# Patient Record
Sex: MALE | Race: BLACK OR AFRICAN AMERICAN | Employment: FULL TIME | URBAN - METROPOLITAN AREA
[De-identification: names, ages, dates, MRNs, and addresses within clinical notes are randomized per-mention and may not be internally consistent; named-entity substitution may affect disease eponyms.]

---

## 2019-05-21 PROBLEM — E55.9 VITAMIN D DEFICIENCY: Status: ACTIVE | Noted: 2018-08-06

## 2019-06-22 PROBLEM — D17.9 MULTIPLE LIPOMAS: Status: ACTIVE | Noted: 2019-06-22

## 2021-01-07 PROBLEM — Z79.899 ENCOUNTER FOR LONG-TERM (CURRENT) USE OF MEDICATIONS: Status: ACTIVE | Noted: 2021-01-07

## 2021-01-07 PROBLEM — B00.9 HSV INFECTION: Status: ACTIVE | Noted: 2020-10-12

## 2021-01-07 PROBLEM — K21.9 GASTROESOPHAGEAL REFLUX DISEASE WITHOUT ESOPHAGITIS: Status: ACTIVE | Noted: 2021-01-07

## 2021-03-04 ENCOUNTER — HOSPITAL ENCOUNTER (EMERGENCY)
Age: 21
Discharge: HOME | End: 2021-03-04
Attending: EMERGENCY MEDICINE
Payer: MEDICAID

## 2021-03-04 VITALS
RESPIRATION RATE: 14 BRPM | OXYGEN SATURATION: 99 % | SYSTOLIC BLOOD PRESSURE: 100 MMHG | WEIGHT: 160 LBS | HEIGHT: 68 IN | HEART RATE: 78 BPM | TEMPERATURE: 97.9 F | BODY MASS INDEX: 24.25 KG/M2 | DIASTOLIC BLOOD PRESSURE: 67 MMHG

## 2021-03-04 DIAGNOSIS — M79.12 STERNOCLEIDOMASTOID MUSCLE TENDERNESS: ICD-10-CM

## 2021-03-04 DIAGNOSIS — M62.838 NECK MUSCLE SPASM: Primary | ICD-10-CM

## 2021-03-04 PROCEDURE — 99283 EMERGENCY DEPT VISIT LOW MDM: CPT

## 2021-03-04 PROCEDURE — 74011250636 HC RX REV CODE- 250/636: Performed by: PHYSICIAN ASSISTANT

## 2021-03-04 PROCEDURE — 96372 THER/PROPH/DIAG INJ SC/IM: CPT

## 2021-03-04 RX ORDER — KETOROLAC TROMETHAMINE 30 MG/ML
60 INJECTION, SOLUTION INTRAMUSCULAR; INTRAVENOUS
Status: COMPLETED | OUTPATIENT
Start: 2021-03-04 | End: 2021-03-04

## 2021-03-04 RX ORDER — CYCLOBENZAPRINE HCL 10 MG
10 TABLET ORAL
Qty: 21 TAB | Refills: 0 | Status: SHIPPED | OUTPATIENT
Start: 2021-03-04 | End: 2021-03-11

## 2021-03-04 RX ADMIN — KETOROLAC TROMETHAMINE 60 MG: 30 INJECTION, SOLUTION INTRAMUSCULAR at 08:26

## 2021-03-04 NOTE — ED NOTES
Patient advises cracking his neck last night around midnight and since that time he says he is having difficulty turning his head completely or moving head up and down. Mask on during triage.

## 2021-03-04 NOTE — LETTER
[address] [address] EMERGENCY DEPT 
[address] 
[place] [address] 
[phone] Work/School Note Date: 3/4/2021 To Whom It May concern: 
 
 
Shandra Montgomery was seen and treated today in the emergency room by the following provider(s): 
Attending Provider: Kayleigh Huitron MD 
Physician Assistant: Regino Zhu. Shandra Montgomery is excused from work/school on 03/04/21. He is clear to return to work/school on 03/05/21. Sincerely, Dee LAN

## 2021-03-04 NOTE — ED NOTES
I have reviewed discharge instructions with the patient. The patient verbalized understanding. Patient left ED via Discharge Method: ambulatory to Home with family. Opportunity for questions and clarification provided. Patient given 1 scripts. To continue your aftercare when you leave the hospital, you may receive an automated call from our care team to check in on how you are doing. This is a free service and part of our promise to provide the best care and service to meet your aftercare needs.  If you have questions, or wish to unsubscribe from this service please call [phone]. Thank you for Choosing our [facility] Emergency Department.

## 2021-03-04 NOTE — DISCHARGE INSTRUCTIONS
Take muscle relaxant every 8 hours as needed for muscle spasm. Do not drive or operate machinery while taking this medication. You may take ibuprofen every 6-8 hours for pain. Do not take the medication until 8 hours after the injection today. You may also take Tylenol every 4-6 hours for pain and apply ice packs and heating pads to the neck for symptomatic relief.

## 2021-04-19 PROBLEM — Z86.19 HISTORY OF HERPES GENITALIS: Status: ACTIVE | Noted: 2021-04-19

## 2022-03-18 PROBLEM — B00.9 HSV INFECTION: Status: ACTIVE | Noted: 2020-10-12

## 2022-03-18 PROBLEM — E55.9 VITAMIN D DEFICIENCY: Status: ACTIVE | Noted: 2018-08-06

## 2022-03-19 PROBLEM — Z86.19 HISTORY OF HERPES GENITALIS: Status: ACTIVE | Noted: 2021-04-19

## 2022-03-19 PROBLEM — M79.12 STERNOCLEIDOMASTOID MUSCLE TENDERNESS: Status: ACTIVE | Noted: 2021-03-04

## 2022-03-19 PROBLEM — D17.9 MULTIPLE LIPOMAS: Status: ACTIVE | Noted: 2019-06-22

## 2022-03-19 PROBLEM — M62.838 NECK MUSCLE SPASM: Status: ACTIVE | Noted: 2021-03-04

## 2022-03-19 PROBLEM — K21.9 GASTROESOPHAGEAL REFLUX DISEASE WITHOUT ESOPHAGITIS: Status: ACTIVE | Noted: 2021-01-07

## 2022-03-20 PROBLEM — Z79.899 ENCOUNTER FOR LONG-TERM (CURRENT) USE OF MEDICATIONS: Status: ACTIVE | Noted: 2021-01-07

## 2022-07-25 ENCOUNTER — TELEMEDICINE (OUTPATIENT)
Dept: PRIMARY CARE CLINIC | Facility: CLINIC | Age: 22
End: 2022-07-25
Payer: COMMERCIAL

## 2022-07-25 DIAGNOSIS — Z79.899 ENCOUNTER FOR LONG-TERM (CURRENT) USE OF MEDICATIONS: ICD-10-CM

## 2022-07-25 DIAGNOSIS — K21.9 GASTROESOPHAGEAL REFLUX DISEASE WITHOUT ESOPHAGITIS: ICD-10-CM

## 2022-07-25 DIAGNOSIS — E55.9 VITAMIN D DEFICIENCY: Primary | ICD-10-CM

## 2022-07-25 DIAGNOSIS — Z13.220 SCREENING FOR LIPID DISORDERS: ICD-10-CM

## 2022-07-25 DIAGNOSIS — Z83.3 FAMILY HISTORY OF DIABETES MELLITUS IN FATHER: ICD-10-CM

## 2022-07-25 PROCEDURE — 99212 OFFICE O/P EST SF 10 MIN: CPT | Performed by: FAMILY MEDICINE

## 2022-07-25 NOTE — PROGRESS NOTES
Emy Serrato M.D.  [address] [facility]  Phone:  [phone]  Fax:  [phone]          I was in the office while conducting this encounter. The patient was at home. CHIEF COMPLAINT:  Chief Complaint   Patient presents with    Gastroesophageal Reflux     He continues to take Omeprazole for reflux. The medication is helping. Other     He continues to take weekly vitamin D as prescribed. He is doing well with the medication. HISTORY OF PRESENT ILLNESS:  Mr. Artemio Carrillo is a 19 y.o. male  who presents for follow-up. He continues to take omeprazole daily as prescribed for reflux. The medication is helping. He denies any blood in the stools or black stools. He continues to take weekly vitamin D as prescribed. He is doing well with the medication. No other complaints. Taking medications as prescribed. Medications reviewed and updated. HISTORY:  Allergies   Allergen Reactions    Shellfish Allergy Nausea And Vomiting         REVIEW OF SYSTEMS:  Review of systems is as indicated in HPI otherwise negative. PHYSICAL EXAM:    Vital Signs: (As obtained by patient/caregiver at home)  No flowsheet data found.         Constitutional: [x] Appears well-developed and well-nourished [x] No apparent distress      [] Abnormal -     Mental status: [x] Alert and awake  [x] Oriented to person/place/time [x] Able to follow commands    [] Abnormal -     Eyes:   EOM    [x]  Normal    [] Abnormal -   Sclera  [x]  Normal    [] Abnormal -          Discharge [x]  None visible   [] Abnormal -     HENT: [x] Normocephalic, atraumatic  [] Abnormal -   [x] Mouth/Throat: Mucous membranes are moist    External Ears [x] Normal  [] Abnormal -    Neck: [x] No visualized mass [] Abnormal -     Pulmonary/Chest: [x] Respiratory effort normal   [x] No visualized signs of difficulty breathing or respiratory distress             Musculoskeletal:   [x] Normal gait with no signs of ataxia         [x] Normal range of motion of neck        [] Abnormal -     Neurological:        [x] No Facial Asymmetry (Cranial nerve 7 motor function) (limited exam due to video visit)          [x] No gaze palsy        [] Abnormal -          Skin:        [x] No significant exanthematous lesions or discoloration noted on facial skin         [] Abnormal -            Psychiatric:       [x] Normal Affect [] Abnormal -        [x] No Hallucinations    PHQ:  PHQ-9  7/8/2021   Little interest or pleasure in doing things 0   Total Score PHQ 2 0       LABS  No results found for this visit on 07/25/22.   Office Visit on 02/06/2020   Component Date Value Ref Range Status    Glucose 02/06/2020 73  65 - 99 mg/dL Final    BUN 02/06/2020 12  6 - 20 mg/dL Final    Creatinine 02/06/2020 1.23  0.76 - 1.27 mg/dL Final    EGFR IF NonAfrican American 02/06/2020 83  >59 mL/min/1.73 Final    GFR  02/06/2020 96  >59 mL/min/1.73 Final    Bun/Cre Ratio 02/06/2020 10  9 - 20 NA Final    Sodium 02/06/2020 143  134 - 144 mmol/L Final    Potassium 02/06/2020 4.6  3.5 - 5.2 mmol/L Final    Chloride 02/06/2020 104  96 - 106 mmol/L Final    CO2 02/06/2020 24  20 - 29 mmol/L Final    Calcium 02/06/2020 9.5  8.7 - 10.2 mg/dL Final    Total Protein 02/06/2020 7.1  6.0 - 8.5 g/dL Final    Albumin 02/06/2020 4.3  4.1 - 5.2 g/dL Final                  **Please note reference interval change**    Globulin, Total 02/06/2020 2.8  1.5 - 4.5 g/dL Final    Albumin/Globulin Ratio 02/06/2020 1.5  1.2 - 2.2 NA Final    Total Bilirubin 02/06/2020 0.4  0.0 - 1.2 mg/dL Final    Alkaline Phosphatase 02/06/2020 70  39 - 117 IU/L Final    AST 02/06/2020 17  0 - 40 IU/L Final    ALT 02/06/2020 9  0 - 44 IU/L Final    WBC 02/06/2020 5.3  3.4 - 10.8 x10E3/uL Final    RBC 02/06/2020 5.24  4.14 - 5.80 x10E6/uL Final    Hemoglobin 02/06/2020 15.0  13.0 - 17.7 g/dL Final    Hematocrit 02/06/2020 44.2  37.5 - 51.0 % Final    MCV 02/06/2020 84  79 - 97 fL Final    MCH 02/06/2020 28.6  26.6 - 33.0 pg Final    MCHC 02/06/2020 33.9  31.5 - 35.7 g/dL Final    RDW 02/06/2020 13.2  11.6 - 15.4 % Final    Platelets 14/56[birth year]1988 243  150 - 450 x10E3/uL Final    Neutrophils % 02/06/2020 38  Not Estab. % Final    Lymphocytes % 02/06/2020 50  Not Estab. % Final    Monocytes % 02/06/2020 10  Not Estab. % Final    Eosinophils % 02/06/2020 2  Not Estab. % Final    Basophils % 02/06/2020 0  Not Estab. % Final    Neutrophils Absolute 02/06/2020 2.0  1.4 - 7.0 x10E3/uL Final    Lymphocytes Absolute 02/06/2020 2.6  0.7 - 3.1 x10E3/uL Final    Monocytes Absolute 02/06/2020 0.5  0.1 - 0.9 x10E3/uL Final    Eosinophils Absolute 02/06/2020 0.1  0.0 - 0.4 x10E3/uL Final    Basophils Absolute 02/06/2020 0.0  0.0 - 0.2 x10E3/uL Final    Immature Granulocytes 02/06/2020 0  Not Estab. % Final    GRANULOCYTE ABSOLUTE COUNT 02/06/2020 0.0  0.0 - 0.1 x10E3/uL Final    Cholesterol, Total 02/06/2020 106  100 - 199 mg/dL Final    Triglycerides 02/06/2020 60  0 - 149 mg/dL Final    HDL 02/06/2020 30 (A) >39 mg/dL Final    VLDL Cholesterol Calculated 02/06/2020 12  5 - 40 mg/dL Final    LDL Calculated 02/06/2020 64  0 - 99 mg/dL Final    LDl/HDL Ratio 02/06/2020 2.1  0.0 - 3.6 ratio Final    Comment:                                     LDL/HDL Ratio                                              Men  Women                                1/2 Avg. Risk  1.0    1.5                                    Avg.Risk  3.6    3.2                                 2X Avg. Risk  6.2    5.0                                 3X Avg. Risk  8.0    6.1      Vit D, 25-Hydroxy 02/06/2020 18.9 (A) 30.0 - 100.0 ng/mL Final    Comment: Vitamin D deficiency has been defined by the [address] practice guideline as a  level of serum 25-OH vitamin D less than 20 ng/mL (1,2). The Endocrine Society went on to further define vitamin D  insufficiency as a level between 21 and 29 ng/mL (2).   1. IOM [facility]). 2010. Dietary reference     intakes for calcium and D. [facility]: The     [employer] [facility]. 2. Av MF, Kateryna SANDOVAL, Lebron HESTER, et al.     Evaluation, treatment, and prevention of vitamin D     deficiency: an Endocrine Society clinical practice     guideline. JCEM. 2011 Jul; 96(7):1911-30. IMPRESSION/PLAN     Diagnosis Orders   1. Vitamin D deficiency  Vitamin D 25 Hydroxy      2. Gastroesophageal reflux disease without esophagitis        3. Encounter for long-term (current) use of medications  CBC with Auto Differential    Comprehensive Metabolic Panel      4. Screening for lipid disorders  Lipid Panel      5. Family history of diabetes mellitus in father  Hemoglobin A1C          Follow up and Dispositions:  Return in about 6 months (around 1/25/2023). Patient is stable on medications and will continue current medications. Reviewed medications and side effects in detail. Will check the above labs. Reviewed most recent labs. Reviewed diet, exercise and weight control. Consent: This patient and/or their healthcare decision maker is aware that this patient-initiated Telehealth encounter is a billable service, with coverage as determined by their insurance carrier. Patient is aware that they may receive a bill and has provided verbal consent to proceed: Yes     The patient is being evaluated by a Virtual Visit (video visit) encounter to address concerns as mentioned above. A caregiver was present when appropriate. Due to this being a TeleHealth encounter ([facility]), evaluation of the following organ systems was limited: Vitals/Constitutional/EENT/Resp/CV/GI//MS/Neuro/Skin/Heme-Lymph-Imm.   Pursuant to the emergency declaration under the [facility] waiver authority and the [employer] Act, this Virtual Visit was conducted with patient's (and/or legal guardian's) consent, to reduce the patient's risk of exposure to COVID-19 and provide necessary medical care. The patient (and/or legal guardian) has also been advised to contact this office for worsening conditions or problems, and seek emergency medical treatment and/or call 911 if deemed necessary. Patient identification was verified at the start of the visit: YES    Services were provided through a video synchronous discussion virtually to substitute for in-person clinic visit. Patient and provider were located at their individual homes. Annmarie Lundberg, was evaluated through a synchronous (real-time) audio-video encounter. The patient (or guardian if applicable) is aware that this is a billable service, which includes applicable co-pays. This Virtual Visit was conducted with patient's (and/or legal guardian's) consent. The visit was conducted pursuant to the emergency declaration under the [address] [place] [address], [address] [place] [address] authority and the [employer] Act. Patient identification was verified, and a caregiver was present when appropriate. The patient was located at Home: [address] [place] [address]. Provider was located at [facility] ([address] [place] [address]): [address].. Total Time: minutes: 5-10 minutes. Dictated using voice recognition software.  Proofread, but unrecognized voice recognition errors may exist.    Marshal Babinski, MD  07/25/22

## 2023-03-02 RX ORDER — VALACYCLOVIR HYDROCHLORIDE 1 G/1
TABLET, FILM COATED ORAL
Qty: 30 TABLET | Refills: 0 | Status: SHIPPED | OUTPATIENT
Start: 2023-03-02

## 2023-03-07 RX ORDER — VALACYCLOVIR HYDROCHLORIDE 1 G/1
TABLET, FILM COATED ORAL
Qty: 30 TABLET | Refills: 0 | Status: SHIPPED | OUTPATIENT
Start: 2023-03-07

## 2023-03-07 NOTE — TELEPHONE ENCOUNTER
Requesting a refill on Valacyclovir due to being [place] of [place] and leaving it at home. I told him I would have the nurse call back to discuss what to do.

## 2023-03-07 NOTE — TELEPHONE ENCOUNTER
Patient requests rx be sent to [facility] EMERGENCY [facility] AT [place]

## 2023-04-06 ENCOUNTER — TELEMEDICINE (OUTPATIENT)
Dept: PRIMARY CARE CLINIC | Facility: CLINIC | Age: 23
End: 2023-04-06
Payer: MEDICAID

## 2023-04-06 DIAGNOSIS — Z86.19 HISTORY OF HERPES GENITALIS: ICD-10-CM

## 2023-04-06 DIAGNOSIS — Z13.220 SCREENING FOR LIPID DISORDERS: ICD-10-CM

## 2023-04-06 DIAGNOSIS — Z83.3 FAMILY HISTORY OF DIABETES MELLITUS IN FATHER: ICD-10-CM

## 2023-04-06 DIAGNOSIS — E55.9 VITAMIN D DEFICIENCY: Primary | ICD-10-CM

## 2023-04-06 DIAGNOSIS — Z79.899 ENCOUNTER FOR LONG-TERM (CURRENT) USE OF MEDICATIONS: ICD-10-CM

## 2023-04-06 PROCEDURE — 99214 OFFICE O/P EST MOD 30 MIN: CPT | Performed by: FAMILY MEDICINE

## 2023-04-06 RX ORDER — ERGOCALCIFEROL 1.25 MG/1
50000 CAPSULE ORAL
Qty: 4 CAPSULE | Refills: 5 | Status: SHIPPED | OUTPATIENT
Start: 2023-04-06

## 2023-04-06 RX ORDER — VALACYCLOVIR HYDROCHLORIDE 1 G/1
TABLET, FILM COATED ORAL
Qty: 30 TABLET | Refills: 5 | Status: SHIPPED | OUTPATIENT
Start: 2023-04-06

## 2023-04-06 ASSESSMENT — PATIENT HEALTH QUESTIONNAIRE - PHQ9
SUM OF ALL RESPONSES TO PHQ QUESTIONS 1-9: 0
SUM OF ALL RESPONSES TO PHQ9 QUESTIONS 1 & 2: 0
SUM OF ALL RESPONSES TO PHQ QUESTIONS 1-9: 0
1. LITTLE INTEREST OR PLEASURE IN DOING THINGS: 0
SUM OF ALL RESPONSES TO PHQ QUESTIONS 1-9: 0
2. FEELING DOWN, DEPRESSED OR HOPELESS: 0
SUM OF ALL RESPONSES TO PHQ QUESTIONS 1-9: 0

## 2023-04-06 NOTE — PROGRESS NOTES
Zandra Arredondo M.D.  [address] [facility]  [place]  Phone:  [phone]  Fax:  [phone]          I was in the office while conducting this encounter. The patient was at home. CHIEF COMPLAINT:  Chief Complaint   Patient presents with    Follow-up     Patient here for follow-up. He has been doing well and denies any problems. HISTORY OF PRESENT ILLNESS:  Mr. Melissa Haque is a 23 y.o. male  who presents for follow-up. Patient states that he has been doing well. He takes his weekly vitamin D as prescribed. He needs a refill on the Valtrex and the vitamin D. He has not had any problems with reflux and is doing well and does not need refill on the omeprazole. No other complaints. Taking medications as prescribed. Medications reviewed and updated. HISTORY:  Allergies   Allergen Reactions    Shellfish Allergy Nausea And Vomiting         REVIEW OF SYSTEMS:  Review of systems is as indicated in HPI otherwise negative. PHYSICAL EXAM:    Vital Signs: (As obtained by patient/caregiver at home)  No flowsheet data found.         PHQ:  PHQ-9  4/6/2023   Little interest or pleasure in doing things 0   Little interest or pleasure in doing things -   Feeling down, depressed, or hopeless 0   PHQ-2 Score 0   Total Score PHQ 2 -   PHQ-9 Total Score 0       LABS    Office Visit on 02/06/2020   Component Date Value Ref Range Status    Glucose 02/06/2020 73  65 - 99 mg/dL Final    BUN 02/06/2020 12  6 - 20 mg/dL Final    Creatinine 02/06/2020 1.23  0.76 - 1.27 mg/dL Final    EGFR IF NonAfrican American 02/06/2020 83  >59 mL/min/1.73 Final    GFR  02/06/2020 96  >59 mL/min/1.73 Final    Bun/Cre Ratio 02/06/2020 10  9 - 20 NA Final    Sodium 02/06/2020 143  134 - 144 mmol/L Final    Potassium 02/06/2020 4.6  3.5 - 5.2 mmol/L Final    Chloride 02/06/2020 104  96 - 106 mmol/L Final    CO2 02/06/2020 24  20 - 29 mmol/L Final    Calcium 02/06/2020 9.5  8.7 -

## 2023-05-09 ENCOUNTER — APPOINTMENT (OUTPATIENT)
Dept: GENERAL RADIOLOGY | Age: 23
End: 2023-05-09
Payer: MEDICAID

## 2023-05-09 ENCOUNTER — HOSPITAL ENCOUNTER (EMERGENCY)
Age: 23
Discharge: HOME | End: 2023-05-09
Attending: EMERGENCY MEDICINE
Payer: MEDICAID

## 2023-05-09 VITALS
DIASTOLIC BLOOD PRESSURE: 78 MMHG | BODY MASS INDEX: 25.01 KG/M2 | OXYGEN SATURATION: 97 % | HEART RATE: 72 BPM | RESPIRATION RATE: 14 BRPM | WEIGHT: 165 LBS | SYSTOLIC BLOOD PRESSURE: 113 MMHG | HEIGHT: 68 IN | TEMPERATURE: 98.4 F

## 2023-05-09 DIAGNOSIS — M54.6 ACUTE LEFT-SIDED THORACIC BACK PAIN: ICD-10-CM

## 2023-05-09 DIAGNOSIS — M79.10 MYALGIA: ICD-10-CM

## 2023-05-09 DIAGNOSIS — J40 BRONCHITIS: Primary | ICD-10-CM

## 2023-05-09 LAB
ALBUMIN SERPL-MCNC: 4.6 G/DL (ref 3.5–5)
ALBUMIN/GLOB SERPL: 1.2 (ref 0.4–1.6)
ALP SERPL-CCNC: 107 U/L (ref 50–136)
ALT SERPL-CCNC: 21 U/L (ref 12–65)
ANION GAP SERPL CALC-SCNC: 7 MMOL/L (ref 2–11)
AST SERPL-CCNC: 16 U/L (ref 15–37)
BILIRUB SERPL-MCNC: 1 MG/DL (ref 0.2–1.1)
BUN SERPL-MCNC: 18 MG/DL (ref 6–23)
CALCIUM SERPL-MCNC: 9.9 MG/DL (ref 8.3–10.4)
CHLORIDE SERPL-SCNC: 107 MMOL/L (ref 101–110)
CO2 SERPL-SCNC: 26 MMOL/L (ref 21–32)
CREAT SERPL-MCNC: 1.44 MG/DL (ref 0.8–1.5)
EKG ATRIAL RATE: 79 BPM
EKG ATRIAL RATE: 91 BPM
EKG DIAGNOSIS: NORMAL
EKG DIAGNOSIS: NORMAL
EKG P AXIS: 109 DEGREES
EKG P AXIS: 115 DEGREES
EKG P-R INTERVAL: 112 MS
EKG P-R INTERVAL: 124 MS
EKG Q-T INTERVAL: 374 MS
EKG Q-T INTERVAL: 390 MS
EKG QRS DURATION: 74 MS
EKG QRS DURATION: 80 MS
EKG QTC CALCULATION (BAZETT): 428 MS
EKG QTC CALCULATION (BAZETT): 474 MS
EKG R AXIS: 177 DEGREES
EKG R AXIS: 58 DEGREES
EKG T AXIS: 209 DEGREES
EKG T AXIS: 24 DEGREES
EKG VENTRICULAR RATE: 79 BPM
EKG VENTRICULAR RATE: 89 BPM
ERYTHROCYTE [DISTWIDTH] IN BLOOD BY AUTOMATED COUNT: 12.9 % (ref 11.9–14.6)
GLOBULIN SER CALC-MCNC: 4 G/DL (ref 2.8–4.5)
GLUCOSE SERPL-MCNC: 93 MG/DL (ref 65–100)
HCT VFR BLD AUTO: 45.3 % (ref 41.1–50.3)
HGB BLD-MCNC: 15.3 G/DL (ref 13.6–17.2)
LIPASE SERPL-CCNC: 85 U/L (ref 73–393)
MCH RBC QN AUTO: 29.5 PG (ref 26.1–32.9)
MCHC RBC AUTO-ENTMCNC: 33.8 G/DL (ref 31.4–35)
MCV RBC AUTO: 87.5 FL (ref 82–102)
NRBC # BLD: 0 K/UL (ref 0–0.2)
PLATELET # BLD AUTO: 217 K/UL (ref 150–450)
PMV BLD AUTO: 9.5 FL (ref 9.4–12.3)
POTASSIUM SERPL-SCNC: 3.4 MMOL/L (ref 3.5–5.1)
PROT SERPL-MCNC: 8.6 G/DL (ref 6.3–8.2)
RBC # BLD AUTO: 5.18 M/UL (ref 4.23–5.6)
SODIUM SERPL-SCNC: 140 MMOL/L (ref 133–143)
TROPONIN I SERPL HS-MCNC: <3 PG/ML (ref 0–14)
TROPONIN I SERPL HS-MCNC: <3 PG/ML (ref 0–14)
WBC # BLD AUTO: 14.2 K/UL (ref 4.3–11.1)

## 2023-05-09 PROCEDURE — 80053 COMPREHEN METABOLIC PANEL: CPT

## 2023-05-09 PROCEDURE — 85027 COMPLETE CBC AUTOMATED: CPT

## 2023-05-09 PROCEDURE — 94760 N-INVAS EAR/PLS OXIMETRY 1: CPT

## 2023-05-09 PROCEDURE — 84484 ASSAY OF TROPONIN QUANT: CPT

## 2023-05-09 PROCEDURE — 93005 ELECTROCARDIOGRAM TRACING: CPT | Performed by: EMERGENCY MEDICINE

## 2023-05-09 PROCEDURE — 96360 HYDRATION IV INFUSION INIT: CPT

## 2023-05-09 PROCEDURE — 2580000003 HC RX 258: Performed by: EMERGENCY MEDICINE

## 2023-05-09 PROCEDURE — 83690 ASSAY OF LIPASE: CPT

## 2023-05-09 PROCEDURE — 99285 EMERGENCY DEPT VISIT HI MDM: CPT

## 2023-05-09 PROCEDURE — 71046 X-RAY EXAM CHEST 2 VIEWS: CPT

## 2023-05-09 RX ORDER — DOXYCYCLINE HYCLATE 100 MG
100 TABLET ORAL 2 TIMES DAILY
Qty: 14 TABLET | Refills: 0 | Status: SHIPPED | OUTPATIENT
Start: 2023-05-09 | End: 2023-05-16

## 2023-05-09 RX ORDER — 0.9 % SODIUM CHLORIDE 0.9 %
1000 INTRAVENOUS SOLUTION INTRAVENOUS ONCE
Status: COMPLETED | OUTPATIENT
Start: 2023-05-09 | End: 2023-05-09

## 2023-05-09 RX ADMIN — SODIUM CHLORIDE 1000 ML: 900 INJECTION, SOLUTION INTRAVENOUS at 13:19

## 2023-05-09 ASSESSMENT — PAIN SCALES - GENERAL
PAINLEVEL_OUTOF10: 7
PAINLEVEL_OUTOF10: 6

## 2023-05-09 ASSESSMENT — PAIN DESCRIPTION - LOCATION
LOCATION: BACK
LOCATION: HEAD

## 2023-05-09 ASSESSMENT — PAIN DESCRIPTION - PAIN TYPE: TYPE: ACUTE PAIN

## 2023-05-09 ASSESSMENT — PAIN - FUNCTIONAL ASSESSMENT
PAIN_FUNCTIONAL_ASSESSMENT: 0-10
PAIN_FUNCTIONAL_ASSESSMENT: 0-10

## 2023-05-09 ASSESSMENT — PAIN DESCRIPTION - ORIENTATION: ORIENTATION: LEFT;UPPER

## 2023-05-09 ASSESSMENT — PAIN DESCRIPTION - DESCRIPTORS: DESCRIPTORS: PRESSURE

## 2023-05-09 NOTE — ED TRIAGE NOTES
Pt c/o chest pain and tingling in his hands and feet and lips. Chest pain started this morning on the way to work . Diaphoretic. Head was dizzy.

## 2023-05-09 NOTE — ED PROVIDER NOTES
& T wave abnormality, consider inferior ischemia  Abnormal ECG  When compared with ECG of 09-MAY-2023 09:09,  Current undetermined rhythm precludes rhythm comparison, needs review  Confirmed by [facility]  MD ()Sampson ([id number]) on 5/9/2023 10:19:30 AM     EKG 12 Lead   Result Value Ref Range    Ventricular Rate 79 BPM    Atrial Rate 79 BPM    P-R Interval 112 ms    QRS Duration 74 ms    Q-T Interval 374 ms    QTc Calculation (Bazett) 428 ms    P Axis 115 degrees    R Axis 58 degrees    T Axis 24 degrees    Diagnosis       Normal sinus rhythm  Normal ECG  When compared with ECG of 09-MAY-2023 09:09,  Previous ECG has undetermined rhythm, needs review  QRS axis Shifted left  Confirmed by ILEANA MARTÍNEZ ()Sampson ([id number]) on 5/9/2023 10:19:33 AM          XR CHEST (2 VW)   Final Result   No radiographic evidence of acute cardiopulmonary disease. Voice dictation software was used during the making of this note. This software is not perfect and grammatical and other typographical errors may be present. This note has not been completely proofread for errors.        Rob Odom MD  05/09/23 [id number]

## 2023-05-09 NOTE — ED NOTES
I have reviewed discharge instructions with the patient. The patient verbalized understanding. Patient left ED via Discharge Method: ambulatory to Home with (family/friend). Opportunity for questions and clarification provided. Patient given 2 scripts. No esign         To continue your aftercare when you leave the hospital, you may receive an automated call from our care team to check in on how you are doing. This is a free service and part of our promise to provide the best care and service to meet your aftercare needs.  If you have questions, or wish to unsubscribe from this service please call [phone]. Thank you for Choosing our [facility] Emergency Department.        Jose De Jesus Aldana RN  05/09/23 5924

## 2023-05-09 NOTE — ED NOTES
Orthostatic BP  Laying- 114/73 BP, 75 HR, 21 RR, 100 Sp02  Sit- 114/86 BP, 73 HR, 99 Sp02, 19 RR  Stand- 102/73 BP, 82 HR, 21 RR, 98 Sp02     Lyssa Hurley, RN  05/09/23 1021

## 2023-05-16 ENCOUNTER — TELEPHONE (OUTPATIENT)
Dept: PRIMARY CARE CLINIC | Facility: CLINIC | Age: 23
End: 2023-05-16